# Patient Record
Sex: FEMALE | Race: WHITE | NOT HISPANIC OR LATINO | Employment: OTHER | URBAN - METROPOLITAN AREA
[De-identification: names, ages, dates, MRNs, and addresses within clinical notes are randomized per-mention and may not be internally consistent; named-entity substitution may affect disease eponyms.]

---

## 2019-12-06 ENCOUNTER — TELEPHONE (OUTPATIENT)
Dept: SURGERY | Facility: HOSPITAL | Age: 29
End: 2019-12-06

## 2019-12-13 ENCOUNTER — ANESTHESIA EVENT (OUTPATIENT)
Dept: SURGERY | Facility: HOSPITAL | Age: 29
End: 2019-12-13
Payer: MEDICAID

## 2019-12-13 ENCOUNTER — HOSPITAL ENCOUNTER (OUTPATIENT)
Dept: PREADMISSION TESTING | Facility: HOSPITAL | Age: 29
Discharge: HOME | End: 2019-12-13
Attending: ORTHOPAEDIC SURGERY
Payer: MEDICAID

## 2019-12-13 DIAGNOSIS — Z01.818 PREOP TESTING: Primary | ICD-10-CM

## 2019-12-13 RX ORDER — CEFAZOLIN SODIUM 2 G/50ML
2 SOLUTION INTRAVENOUS
Status: CANCELLED | OUTPATIENT
Start: 2019-12-17

## 2019-12-13 RX ORDER — SODIUM CHLORIDE, SODIUM LACTATE, POTASSIUM CHLORIDE, CALCIUM CHLORIDE 600; 310; 30; 20 MG/100ML; MG/100ML; MG/100ML; MG/100ML
INJECTION, SOLUTION INTRAVENOUS CONTINUOUS
Status: CANCELLED | OUTPATIENT
Start: 2019-12-13

## 2019-12-13 RX ORDER — LIDOCAINE HYDROCHLORIDE 10 MG/ML
1 INJECTION, SOLUTION EPIDURAL; INFILTRATION; INTRACAUDAL; PERINEURAL ONCE
Status: CANCELLED | OUTPATIENT
Start: 2019-12-13 | End: 2019-12-13

## 2019-12-13 NOTE — DISCHARGE INSTRUCTIONS
Your surgery is scheduled for 12/17/19.    Please report to Procedure Check In Room on the 2nd FLOOR at 7:15a.m.          INSTRUCTIONS IMPORTANT!!!  ¨ Do not eat or drink after 12 midnight-including water. OK to brush teeth, no   gum, candy or mints!        ____  Proceed to [facility] on 12/13/19 for additional testing.        ____  Do not wear makeup, including mascara.  ____  No powder, lotions or creams to surgical area.  ____  Please remove all jewelry, including piercings and leave at home.  ____  No money or valuables needed. Please leave at home.  ____  Please bring any documents given by your doctor.  ____  If going home the same day, arrange for a ride home. You will not be able to             drive if Anesthesia was used.  ____  Wear loose fitting clothing. Allow for dressings, bandages.  ____  Stop Aspirin, Ibuprofen, Motrin and Aleve at least 3-5 days before surgery, unless otherwise instructed by your doctor, or the nurse.   You MAY use Tylenol/acetaminophen until day of surgery.  ____  Wash the surgical area with Hibiclens the night before surgery, and again the             morning of surgery.  Be sure to rinse hibiclens off completely (if instructed by   nurse).  ____  If you take diabetic medication, do not take am of surgery unless instructed by Doctor.  ____  Call MD for temperature above 101 degrees or any other signs of infection such as Urinary (bladder) infection, Upper respiratory infection, skin boils, etc.  ____ Stop taking any Fish Oil supplement or any Vitamins that contain Vitamin E at least 5 days prior to surgery.  ____ Do Not wear your contact lenses the day of your procedure.  You may wear your glasses.      ____Do not shave surgical site for 3 days prior to surgery.  ____ Practice Good hand washing before, during, and after procedure.      I have read or had read and explained to me, and understand the above information.  Additional comments or instructions:  For  additional questions call [phone]      ANESTHESIA SIDE EFFECTS  -For the first 24 hours after surgery:  Do not drive, use heavy equipment, make important decisions, or drink alcohol  -It is normal to feel sleepy for several hours.  Rest until you are more awake.  -Have someone stay with you, if needed.  They can watch for problems and help keep you safe.  -Some possible post anesthesia side effects include: nausea and vomiting, sore throat and hoarseness, sleepiness, and dizziness.        Pre-Op Bathing Instructions    Before surgery, you can play an important role in your own health.    Because skin is not sterile, we need to be sure that your skin is as free of germs as possible. By following the instructions below, you can reduce the number of germs on your skin before surgery.    IMPORTANT: You will need to shower with a special soap called Hibiclens*, available at any pharmacy.  If you are allergic to Chlorhexidine (the antiseptic in Hibiclens), use an antibacterial soap such as Dial Soap for your preoperative shower.  You will shower with Hibiclens both the night before your surgery and the morning of your surgery.  Do not use Hibiclens on the head, face or genitals to avoid injury to those areas.    STEP #1: THE NIGHT BEFORE YOUR SURGERY     1. Do not shave the area of your body where your surgery will be performed.  2. Shower and wash your hair and body as usual with your normal soap and shampoo.  3. Rinse your hair and body thoroughly after you shower to remove all soap residue.  4. With your hand, apply one packet of Hibiclens soap to the surgical site.   5. Wash the site gently for five (5) minutes. Do not scrub your skin too hard.   6. Do not wash with your regular soap after Hibiclens is used.  7. Rinse your body thoroughly.  8. Pat yourself dry with a clean, soft towel.  9. Do not use lotion, cream, or powder.  10. Wear clean clothes.    STEP #2: THE MORNING OF YOUR SURGERY     1. Repeat Step #1.    *  Not to be used by people allergic to Chlorhexidine.          Surgery for Anterior Cruciate Ligament (ACL) Injury  The ACL (anterior cruciate ligament) is a band of tough, fibrous tissue that helps stabilize the knee. Injury to this ligament often happens when the knee is forced beyond its normal range of motion. This can stretch or tear the ligament, much like the fibers of a rope coming apart. Both surgical and nonsurgical treatment has been used to recover from an ACL tear. Several types of surgery are available based on your and your healthcare provider's preferences, as well as other factors. Some surgeons will operate soon after an ACL tear. Others prefer several weeks of physical therapy first. There are also different anesthesia choices available.  Preparing for surgery  Do's and don'ts:  · Stop taking aspirin and other medicines 7 or more days before surgery as advised by your healthcare provider.  · Arrange to get crutches to use during recovery.  · Follow any directions you are given for not eating or drinking before surgery.  · Arrange for an adult to drive you home after surgery.   During surgery      The most common type of surgery for an ACL injury is reconstruction. Several types of surgeries are used:  · Patellar tendon graft. This uses a piece of your own patellar tendon between the knee cap and tibia.  · Quadriceps tendon graft. This uses a piece of your own quadriceps tendon between the quadriceps muscle and the knee cap.  · Hamstring tendon graft. This uses a piece of your own hamstring tendon between the hamstring muscle and the tibia.  · A cadaver (allograft) tendon graft. This uses any one of several tendons from a cadaver.  To rebuild your ACL, your healthcare provider may do open surgery or arthroscopy. During arthroscopy, a long, thin tube with a tiny camera is inserted into the knee joint so your healthcare provider can see inside the joint. Tools inserted through small incisions are used  to repair the joint.  After surgery  Here is what to expect:  · Youll spend a few hours in a recovery area. Youll have ice on your knee to prevent swelling, and your leg may be in a brace.  · You may get a continuous cooling machine to relieve swelling and pain.  · You may get medicines to reduce pain and swelling. Take these as prescribed by your healthcare provider.  · Depending on the procedure, physical therapy may begin shortly after surgery. This may include light exercises. In some cases, you may use a CPM (continuous passive motion) machine for a time. This machine flexes and extends the knee, keeping it from getting stiff.  · You can usually go home the same day as surgery. Have an adult family member or friend give you a ride.  Date Last Reviewed: 9/30/2015  © 2890-3775 [employer]. [address] [place] [address], [place] [address]. All rights reserved. This information is not intended as a substitute for professional medical care. Always follow your healthcare professional's instructions.

## 2019-12-13 NOTE — ANESTHESIA PREPROCEDURE EVALUATION
12/13/2019  Jacqueline Lockhart is a 29 y.o., female scheduled for right ACL reconstruction and right meniscus repair on 12/17/19.    Past Medical History:   Diagnosis Date    Closed head injury      Past Surgical History:   Procedure Laterality Date    TONSILLECTOMY         Anesthesia Evaluation    I have reviewed the Patient Summary Reports.    I have reviewed the Nursing Notes.   I have reviewed the Medications.     Review of Systems  Anesthesia Hx:  No problems with previous Anesthesia  Denies Family Hx of Anesthesia complications.    Social:  Social Alcohol Use  Illicit Drug Use: Types of drugs include Marijuana,   Hematology/Oncology:  Hematology Normal        Cardiovascular:  Cardiovascular Normal Exercise tolerance: good   Denies Angina.    Pulmonary:  Pulmonary Normal    Renal/:  Renal/ Normal     Hepatic/GI:  Hepatic/GI Normal    Neurological:  Neurology Normal    Endocrine:  Endocrine Normal        Physical Exam  General:  Well nourished    Airway/Jaw/Neck:  Airway Findings: Mouth Opening: Normal Tongue: Normal  General Airway Assessment: Adult  Mallampati: II  Improves to I with phonation.  TM Distance: Normal, at least 6 cm       Chest/Lungs:  Chest/Lungs Findings: Clear to auscultation, Normal Respiratory Rate     Heart/Vascular:  Heart Findings: Rate: Normal  Rhythm: Regular Rhythm  Sounds: Normal        Mental Status:  Mental Status Findings:  Cooperative, Alert and Oriented         Anesthesia Plan  Type of Anesthesia, risks & benefits discussed:  Anesthesia Type:  general, regional  Patient's Preference:   Intra-op Monitoring Plan: standard ASA monitors  Intra-op Monitoring Plan Comments:   Post Op Pain Control Plan: multimodal analgesia  Post Op Pain Control Plan Comments:   Induction:   IV  Beta Blocker:  Patient is not currently on a Beta-Blocker (No further documentation required).        Informed Consent: Patient understands risks and agrees with Anesthesia plan.  Questions answered. Anesthesia consent signed with patient.  ASA Score: 2     Day of Surgery Review of History & Physical:        Anesthesia Plan Notes: Anesthesia consent to be signed prior to procedure on 12/17/19          Ready For Surgery From Anesthesia Perspective.

## 2019-12-16 NOTE — H&P
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 The patient jumped off of a 5 foot tall stage on October 19, 2019 and sustained a right knee injury. Thereafter the patient had significant swelling of the knee with persistent sensation of instability and block to full range of motion. Since the injury her pain has improved but she continues with these instability episodes as well as inability to fully flex her knee. She denies any other injuries or neurologic complaints.    Location: Right knee    Severity: Pain is 5-8 out of 10    Timing: Pain is worse with activity and instability is worse with twisting motion    Modifying factors: Improved with rest    Quality: Pain is sharp    Duration: Onset 10/19/2019    Context: Associated with jump from 5 feet    Associated signs and symptoms:'s associated with swelling, instability episodes, block to motion       Review of Systems  See chart for complete Review of Systems, Family, and Social history noted by patient and personally reviewed today.      Results/Data    MRI of right knee taken 11/13/2019 at an outside facility was personally reviewed. The patient has an ACL rupture with bone bruising of the lateral tibial plateau and lateral distal femoral condyle. She has large meniscal tears of the medial meniscus and the lateral meniscus. She has a significant effusion. PCL is intact. Posterior lateral corner is intact. Medial collateral ligament is intact. No acute fractures or dislocations are noted in these images. Patellar and quadriceps tendons are intact.     Vitals   Recorded: 07Nov2019 02:09PM   Height 5 ft 2 in   Weight 175 lb    BMI Calculated 32.01   BSA Calculated 1.81   Systolic 107   Diastolic 70   Heart Rate 67   Pain Scale 8     Physical Exam  Vital signs are noted in the chart above.     The patient appears generally well and stated age.    The patient is oriented x 3.    Mood  and affect are appropriate and normal.     Gait is mildly antalgic favoring the right lower extremity.    Right knee is examined and found to have a mild effusion. No erythema or warmth noted about the knee. No open wounds. Range of motion is about 10degrees to 45 degrees with a hard stop/block to flexion at that degree. Patient has a grade 2 Lockman with no endpoint and a grade 2 pivot shift. She has pain with lateral Jazmin examination which is unable to be fully assessed due to her block of motion. The knee is stable to varus and valgus directed stress. Posterior drawer is negative. She has grossly normal motor and sensory function with a warm and well-perfused extremity distally.     Assessment   1. Rupture of anterior cruciate ligament of right knee, subsequent encounter (S83.057D)   · The patient has a confirmed ACL rupture on her MRI of her right knee. After discussing      the patient's desired level of activity and her inability to do so given her current knee      condition we discussed surgical treatment. Surgery would involve ACL ligament      reconstruction. We discussed different graft types and decided that the patient would      benefit from an Achilles allograft reconstruction. Postoperative course following this      particular ACL reconstruction will depend on the status of the meniscus, whether      repair is indicated or not defined intraoperatively. The patient would like to undergo      surgical treatment early December. I have given a list of home exercises to work      on knee range of motion until surgery.   2. Acute medial meniscus tear of right knee, initial encounter (S83.225A)   · Patient has a medial meniscus tear on MRI which appears to be a large tear involving      the majority of the body. This will likely require repair intraoperatively. The size and      morphology of the tear will determine the postoperative weightbearing restrictions. I have      advised the patient as to the  weightbearing restrictions required with meniscus repair      and she agrees to abide by them after surgery.   3. Peripheral tear of lateral meniscus of right knee as current injury, initial encounter   (S83.386A)   · The patient has a large lateral meniscus tear which would benefit from repair      intraoperatively. I have instructed the patient as to the weightbearing restrictions following      meniscus repair and she agrees to abide.    Plan  1. Plan for right knee ACL arthroscopic reconstruction with Achilles allograft, medial meniscus repair, lateral meniscus repair  #2 patient given knee exercises to improve range of motion  3. Patient is to avoid exercising and aggressive physical activity until surgery.  #4 patient will follow-up after surgery.

## 2019-12-17 ENCOUNTER — HOSPITAL ENCOUNTER (OUTPATIENT)
Facility: HOSPITAL | Age: 29
Discharge: HOME | End: 2019-12-17
Attending: ORTHOPAEDIC SURGERY | Admitting: ORTHOPAEDIC SURGERY
Payer: MEDICAID

## 2019-12-17 ENCOUNTER — ANESTHESIA (OUTPATIENT)
Dept: SURGERY | Facility: HOSPITAL | Age: 29
End: 2019-12-17
Payer: MEDICAID

## 2019-12-17 VITALS
DIASTOLIC BLOOD PRESSURE: 62 MMHG | HEART RATE: 76 BPM | RESPIRATION RATE: 20 BRPM | OXYGEN SATURATION: 98 % | TEMPERATURE: 98 F | HEIGHT: 62 IN | BODY MASS INDEX: 32.2 KG/M2 | WEIGHT: 175 LBS | SYSTOLIC BLOOD PRESSURE: 131 MMHG

## 2019-12-17 DIAGNOSIS — Z01.818 PREOP TESTING: ICD-10-CM

## 2019-12-17 DIAGNOSIS — S83.519A ACL (ANTERIOR CRUCIATE LIGAMENT) RUPTURE: ICD-10-CM

## 2019-12-17 PROCEDURE — 36000711: Performed by: ORTHOPAEDIC SURGERY

## 2019-12-17 PROCEDURE — 99900037 HC PT THERAPY SCREENING (STAT)

## 2019-12-17 PROCEDURE — C1713 ANCHOR/SCREW BN/BN,TIS/BN: HCPCS | Performed by: ORTHOPAEDIC SURGERY

## 2019-12-17 PROCEDURE — 63600175 PHARM REV CODE 636 W HCPCS: Performed by: NURSE ANESTHETIST, CERTIFIED REGISTERED

## 2019-12-17 PROCEDURE — 01400 ANES OPN/ARTHRS KNEE JT NOS: CPT | Performed by: ORTHOPAEDIC SURGERY

## 2019-12-17 PROCEDURE — 63600175 PHARM REV CODE 636 W HCPCS: Performed by: ORTHOPAEDIC SURGERY

## 2019-12-17 PROCEDURE — 71000015 HC POSTOP RECOV 1ST HR: Performed by: ORTHOPAEDIC SURGERY

## 2019-12-17 PROCEDURE — 64447 NJX AA&/STRD FEMORAL NRV IMG: CPT | Mod: 59 | Performed by: STUDENT IN AN ORGANIZED HEALTH CARE EDUCATION/TRAINING PROGRAM

## 2019-12-17 PROCEDURE — 25000003 PHARM REV CODE 250: Performed by: ORTHOPAEDIC SURGERY

## 2019-12-17 PROCEDURE — 27201423 OPTIME MED/SURG SUP & DEVICES STERILE SUPPLY: Performed by: ORTHOPAEDIC SURGERY

## 2019-12-17 PROCEDURE — 71000016 HC POSTOP RECOV ADDL HR: Performed by: ORTHOPAEDIC SURGERY

## 2019-12-17 PROCEDURE — 37000009 HC ANESTHESIA EA ADD 15 MINS: Performed by: ORTHOPAEDIC SURGERY

## 2019-12-17 PROCEDURE — C9290 INJ, BUPIVACAINE LIPOSOME: HCPCS | Performed by: ORTHOPAEDIC SURGERY

## 2019-12-17 PROCEDURE — 71000039 HC RECOVERY, EACH ADD'L HOUR: Performed by: ORTHOPAEDIC SURGERY

## 2019-12-17 PROCEDURE — 63600175 PHARM REV CODE 636 W HCPCS: Performed by: ANESTHESIOLOGY

## 2019-12-17 PROCEDURE — 36000710: Performed by: ORTHOPAEDIC SURGERY

## 2019-12-17 PROCEDURE — 37000008 HC ANESTHESIA 1ST 15 MINUTES: Performed by: ORTHOPAEDIC SURGERY

## 2019-12-17 PROCEDURE — 71000033 HC RECOVERY, INTIAL HOUR: Performed by: ORTHOPAEDIC SURGERY

## 2019-12-17 PROCEDURE — 25000003 PHARM REV CODE 250: Performed by: ANESTHESIOLOGY

## 2019-12-17 PROCEDURE — C1762 CONN TISS, HUMAN(INC FASCIA): HCPCS | Performed by: ORTHOPAEDIC SURGERY

## 2019-12-17 DEVICE — KIT SECONDARY FIX ACL PCL REP: Type: IMPLANTABLE DEVICE | Site: KNEE | Status: FUNCTIONAL

## 2019-12-17 DEVICE — IMPLANTABLE DEVICE: Type: IMPLANTABLE DEVICE | Site: KNEE | Status: FUNCTIONAL

## 2019-12-17 DEVICE — TENDON ACHILLES W/CALC 19.5CM: Type: IMPLANTABLE DEVICE | Site: KNEE | Status: FUNCTIONAL

## 2019-12-17 DEVICE — SCREW CANNULATED 7MM X 20MM: Type: IMPLANTABLE DEVICE | Site: KNEE | Status: FUNCTIONAL

## 2019-12-17 RX ORDER — SODIUM CHLORIDE, SODIUM LACTATE, POTASSIUM CHLORIDE, CALCIUM CHLORIDE 600; 310; 30; 20 MG/100ML; MG/100ML; MG/100ML; MG/100ML
INJECTION, SOLUTION INTRAVENOUS CONTINUOUS
Status: DISCONTINUED | OUTPATIENT
Start: 2019-12-17 | End: 2019-12-17 | Stop reason: HOSPADM

## 2019-12-17 RX ORDER — CELECOXIB 200 MG/1
200 CAPSULE ORAL 2 TIMES DAILY
Qty: 28 CAPSULE | Refills: 0 | Status: SHIPPED | OUTPATIENT
Start: 2019-12-17 | End: 2019-12-31

## 2019-12-17 RX ORDER — CEFAZOLIN SODIUM 2 G/50ML
2 SOLUTION INTRAVENOUS ONCE
Status: DISCONTINUED | OUTPATIENT
Start: 2019-12-17 | End: 2019-12-17 | Stop reason: HOSPADM

## 2019-12-17 RX ORDER — BUPIVACAINE HCL/EPINEPHRINE 0.25-.0005
VIAL (ML) INJECTION
Status: DISCONTINUED | OUTPATIENT
Start: 2019-12-17 | End: 2019-12-17 | Stop reason: HOSPADM

## 2019-12-17 RX ORDER — SODIUM CHLORIDE, SODIUM LACTATE, POTASSIUM CHLORIDE, CALCIUM CHLORIDE 600; 310; 30; 20 MG/100ML; MG/100ML; MG/100ML; MG/100ML
INJECTION, SOLUTION INTRAVENOUS CONTINUOUS PRN
Status: DISCONTINUED | OUTPATIENT
Start: 2019-12-17 | End: 2019-12-17

## 2019-12-17 RX ORDER — LIDOCAINE HYDROCHLORIDE 10 MG/ML
1 INJECTION, SOLUTION EPIDURAL; INFILTRATION; INTRACAUDAL; PERINEURAL ONCE
Status: DISCONTINUED | OUTPATIENT
Start: 2019-12-17 | End: 2019-12-17 | Stop reason: HOSPADM

## 2019-12-17 RX ORDER — PROPOFOL 10 MG/ML
VIAL (ML) INTRAVENOUS
Status: DISCONTINUED | OUTPATIENT
Start: 2019-12-17 | End: 2019-12-17

## 2019-12-17 RX ORDER — ONDANSETRON 2 MG/ML
4 INJECTION INTRAMUSCULAR; INTRAVENOUS DAILY PRN
Status: DISCONTINUED | OUTPATIENT
Start: 2019-12-17 | End: 2019-12-17 | Stop reason: HOSPADM

## 2019-12-17 RX ORDER — FENTANYL CITRATE 50 UG/ML
INJECTION, SOLUTION INTRAMUSCULAR; INTRAVENOUS
Status: DISCONTINUED | OUTPATIENT
Start: 2019-12-17 | End: 2019-12-17

## 2019-12-17 RX ORDER — CELECOXIB 100 MG/1
200 CAPSULE ORAL
Status: COMPLETED | OUTPATIENT
Start: 2019-12-17 | End: 2019-12-17

## 2019-12-17 RX ORDER — ACETAMINOPHEN 500 MG
1000 TABLET ORAL
Status: COMPLETED | OUTPATIENT
Start: 2019-12-17 | End: 2019-12-17

## 2019-12-17 RX ORDER — ONDANSETRON 2 MG/ML
INJECTION INTRAMUSCULAR; INTRAVENOUS
Status: DISCONTINUED | OUTPATIENT
Start: 2019-12-17 | End: 2019-12-17

## 2019-12-17 RX ORDER — KETOROLAC TROMETHAMINE 30 MG/ML
INJECTION, SOLUTION INTRAMUSCULAR; INTRAVENOUS
Status: DISCONTINUED | OUTPATIENT
Start: 2019-12-17 | End: 2019-12-17

## 2019-12-17 RX ORDER — OXYCODONE HYDROCHLORIDE 5 MG/1
5 TABLET ORAL
Status: DISCONTINUED | OUTPATIENT
Start: 2019-12-17 | End: 2019-12-17 | Stop reason: HOSPADM

## 2019-12-17 RX ORDER — CEFAZOLIN SODIUM 2 G/50ML
2 SOLUTION INTRAVENOUS
Status: DISCONTINUED | OUTPATIENT
Start: 2019-12-17 | End: 2019-12-17 | Stop reason: HOSPADM

## 2019-12-17 RX ORDER — CEFAZOLIN SODIUM 2 G/50ML
2 SOLUTION INTRAVENOUS
Status: COMPLETED | OUTPATIENT
Start: 2019-12-17 | End: 2019-12-17

## 2019-12-17 RX ORDER — ASPIRIN 325 MG
325 TABLET, DELAYED RELEASE (ENTERIC COATED) ORAL DAILY
Qty: 30 TABLET | Refills: 0 | Status: SHIPPED | OUTPATIENT
Start: 2019-12-17 | End: 2020-01-16

## 2019-12-17 RX ORDER — HYDROMORPHONE HYDROCHLORIDE 2 MG/ML
0.5 INJECTION, SOLUTION INTRAMUSCULAR; INTRAVENOUS; SUBCUTANEOUS EVERY 5 MIN PRN
Status: COMPLETED | OUTPATIENT
Start: 2019-12-17 | End: 2019-12-17

## 2019-12-17 RX ORDER — SODIUM CHLORIDE 9 MG/ML
INJECTION, SOLUTION INTRAVENOUS CONTINUOUS
Status: ACTIVE | OUTPATIENT
Start: 2019-12-17

## 2019-12-17 RX ORDER — MIDAZOLAM HYDROCHLORIDE 1 MG/ML
INJECTION, SOLUTION INTRAMUSCULAR; INTRAVENOUS
Status: DISCONTINUED | OUTPATIENT
Start: 2019-12-17 | End: 2019-12-17

## 2019-12-17 RX ORDER — DEXAMETHASONE SODIUM PHOSPHATE 4 MG/ML
INJECTION, SOLUTION INTRA-ARTICULAR; INTRALESIONAL; INTRAMUSCULAR; INTRAVENOUS; SOFT TISSUE
Status: DISCONTINUED | OUTPATIENT
Start: 2019-12-17 | End: 2019-12-17

## 2019-12-17 RX ORDER — PREGABALIN 75 MG/1
300 CAPSULE ORAL
Status: COMPLETED | OUTPATIENT
Start: 2019-12-17 | End: 2019-12-17

## 2019-12-17 RX ORDER — SUCCINYLCHOLINE CHLORIDE 20 MG/ML
INJECTION INTRAMUSCULAR; INTRAVENOUS
Status: DISCONTINUED | OUTPATIENT
Start: 2019-12-17 | End: 2019-12-17

## 2019-12-17 RX ORDER — EPINEPHRINE 1 MG/ML
INJECTION, SOLUTION INTRACARDIAC; INTRAMUSCULAR; INTRAVENOUS; SUBCUTANEOUS
Status: DISCONTINUED | OUTPATIENT
Start: 2019-12-17 | End: 2019-12-17 | Stop reason: HOSPADM

## 2019-12-17 RX ORDER — GABAPENTIN 300 MG/1
300 CAPSULE ORAL NIGHTLY
Qty: 7 CAPSULE | Refills: 0 | Status: SHIPPED | OUTPATIENT
Start: 2019-12-17 | End: 2019-12-24

## 2019-12-17 RX ORDER — LIDOCAINE HCL/PF 100 MG/5ML
SYRINGE (ML) INTRAVENOUS
Status: DISCONTINUED | OUTPATIENT
Start: 2019-12-17 | End: 2019-12-17

## 2019-12-17 RX ORDER — BUPIVACAINE HYDROCHLORIDE 2.5 MG/ML
INJECTION, SOLUTION EPIDURAL; INFILTRATION; INTRACAUDAL
Status: DISCONTINUED | OUTPATIENT
Start: 2019-12-17 | End: 2019-12-17 | Stop reason: HOSPADM

## 2019-12-17 RX ORDER — ACETAMINOPHEN 500 MG
1000 TABLET ORAL EVERY 8 HOURS
Qty: 42 TABLET | Refills: 0 | Status: SHIPPED | OUTPATIENT
Start: 2019-12-17 | End: 2019-12-24

## 2019-12-17 RX ORDER — OXYCODONE HYDROCHLORIDE 5 MG/1
5 TABLET ORAL EVERY 4 HOURS PRN
Qty: 30 TABLET | Refills: 0 | Status: SHIPPED | OUTPATIENT
Start: 2019-12-17

## 2019-12-17 RX ORDER — MUPIROCIN 20 MG/G
OINTMENT TOPICAL
Status: DISPENSED | OUTPATIENT
Start: 2019-12-17

## 2019-12-17 RX ADMIN — FENTANYL CITRATE 50 MCG: 50 INJECTION, SOLUTION INTRAMUSCULAR; INTRAVENOUS at 11:12

## 2019-12-17 RX ADMIN — SODIUM CHLORIDE, SODIUM LACTATE, POTASSIUM CHLORIDE, AND CALCIUM CHLORIDE: .6; .31; .03; .02 INJECTION, SOLUTION INTRAVENOUS at 10:12

## 2019-12-17 RX ADMIN — ACETAMINOPHEN 1000 MG: 500 TABLET ORAL at 08:12

## 2019-12-17 RX ADMIN — SUCCINYLCHOLINE CHLORIDE 120 MG: 20 INJECTION, SOLUTION INTRAMUSCULAR; INTRAVENOUS at 09:12

## 2019-12-17 RX ADMIN — PROPOFOL 200 MG: 10 INJECTION, EMULSION INTRAVENOUS at 09:12

## 2019-12-17 RX ADMIN — MUPIROCIN: 20 OINTMENT TOPICAL at 08:12

## 2019-12-17 RX ADMIN — OXYCODONE HYDROCHLORIDE 5 MG: 5 TABLET ORAL at 12:12

## 2019-12-17 RX ADMIN — FENTANYL CITRATE 100 MCG: 50 INJECTION, SOLUTION INTRAMUSCULAR; INTRAVENOUS at 10:12

## 2019-12-17 RX ADMIN — HYDROMORPHONE HYDROCHLORIDE 0.5 MG: 2 INJECTION INTRAMUSCULAR; INTRAVENOUS; SUBCUTANEOUS at 12:12

## 2019-12-17 RX ADMIN — MIDAZOLAM 5 MG: 1 INJECTION INTRAMUSCULAR; INTRAVENOUS at 08:12

## 2019-12-17 RX ADMIN — DEXAMETHASONE SODIUM PHOSPHATE 10 MG: 4 INJECTION, SOLUTION INTRAMUSCULAR; INTRAVENOUS at 09:12

## 2019-12-17 RX ADMIN — PREGABALIN 300 MG: 75 CAPSULE ORAL at 08:12

## 2019-12-17 RX ADMIN — KETOROLAC TROMETHAMINE 30 MG: 30 INJECTION, SOLUTION INTRAMUSCULAR; INTRAVENOUS at 11:12

## 2019-12-17 RX ADMIN — SODIUM CHLORIDE, SODIUM LACTATE, POTASSIUM CHLORIDE, AND CALCIUM CHLORIDE: .6; .31; .03; .02 INJECTION, SOLUTION INTRAVENOUS at 09:12

## 2019-12-17 RX ADMIN — ONDANSETRON 8 MG: 2 INJECTION, SOLUTION INTRAMUSCULAR; INTRAVENOUS at 11:12

## 2019-12-17 RX ADMIN — FENTANYL CITRATE 100 MCG: 50 INJECTION, SOLUTION INTRAMUSCULAR; INTRAVENOUS at 09:12

## 2019-12-17 RX ADMIN — LIDOCAINE HYDROCHLORIDE 100 MG: 20 INJECTION, SOLUTION INTRAVENOUS at 09:12

## 2019-12-17 RX ADMIN — CELECOXIB 200 MG: 100 CAPSULE ORAL at 08:12

## 2019-12-17 RX ADMIN — SODIUM CHLORIDE, SODIUM LACTATE, POTASSIUM CHLORIDE, AND CALCIUM CHLORIDE: .6; .31; .03; .02 INJECTION, SOLUTION INTRAVENOUS at 08:12

## 2019-12-17 RX ADMIN — CEFAZOLIN SODIUM 2 G: 2 SOLUTION INTRAVENOUS at 09:12

## 2019-12-17 NOTE — ANESTHESIA PROCEDURE NOTES
Peripheral Block    Patient location during procedure: pre-op   Block not for primary anesthetic.  Reason for block: at surgeon's request and post-op pain management   Post-op Pain Location: Right Leg   Start time: 12/17/2019 8:42 AM  Timeout: 12/17/2019 8:41 AM   End time: 12/17/2019 8:50 AM    Staffing  Authorizing Provider: Ladarius Contreras MD  Performing Provider: Vicente Hampton MD    Preanesthetic Checklist  Completed: patient identified, site marked, surgical consent, pre-op evaluation, timeout performed, IV checked, risks and benefits discussed and monitors and equipment checked  Peripheral Block  Patient position: supine  Prep: ChloraPrep  Patient monitoring: heart rate, cardiac monitor, continuous pulse ox, continuous capnometry and frequent blood pressure checks  Block type: adductor canal  Laterality: right  Injection technique: single shot  Needle  Needle type: Stimuplex   Needle gauge: 21 G  Needle length: 4 in  Needle localization: anatomical landmarks and ultrasound guidance   -ultrasound image captured on disc.  Assessment  Injection assessment: negative aspiration, negative parasthesia and local visualized surrounding nerve  Paresthesia pain: none  Heart rate change: no  Slow fractionated injection: yes  Additional Notes  VSS.  [facility] RN monitoring vitals throughout procedure.  Patient tolerated procedure well.     30 ml 0.2% Ropi, 4 mg Dexamethasone, 25 mcg Precedex

## 2019-12-17 NOTE — PT/OT/SLP PROGRESS
Physical Therapy Screen      Patient Name:  Jacqueline Lockhart   MRN:  [id number]    Patient seen bedside prior to surgery, has own axillary crutches. Adjusted and instructed with axillary crutches NWB gait on level and unlevel surface. Patient Mod I with gait and transitional mvts. Will DC PT service.    Krish Flannery, PT

## 2019-12-17 NOTE — BRIEF OP NOTE
[facility]  Brief Operative Note    Surgery Date: 12/17/2019     Surgeon(s) and Role:     * Ladarius George IV, MD - Primary    Assisting Surgeon: None    Pre-op Diagnosis:  Rupture of anterior cruciate ligament of right knee [S83.511A]  Acute medial meniscus tear of right knee [S83.241A]  Peripheral tear of lateral meniscus of right knee as current injury [S83.261A]    Post-op Diagnosis:  Post-Op Diagnosis Codes:     * Rupture of anterior cruciate ligament of right knee [S83.511A]     * Acute medial meniscus tear of right knee [S83.241A]     * Peripheral tear of lateral meniscus of right knee as current injury [S83.261A]    Procedure(s) (LRB):  RECONSTRUCTION, KNEE, ACL, USING GRAFT (Right)  MENISCECTOMY, KNEE, MEDIAL and a lateral (Right)    Anesthesia: General    Description of the findings of the procedure(s): see operative report    Estimated Blood Loss: * No values recorded between 12/17/2019  9:49 AM and 12/17/2019 11:52 AM *         Specimens:   Specimen (12h ago, onward)    None            Discharge Note    OUTCOME: Patient tolerated treatment/procedure well without complication and is now ready for discharge.    DISPOSITION: Home or Self Care    FOLLOWUP: In clinic

## 2019-12-17 NOTE — ANESTHESIA POSTPROCEDURE EVALUATION
Anesthesia Post Evaluation    Patient: Jacqueline Lockhart    Procedure(s) Performed: Procedure(s) (LRB):  RECONSTRUCTION, KNEE, ACL, USING GRAFT (Right)  MENISCECTOMY, KNEE, MEDIAL and a lateral (Right)    Final Anesthesia Type: general    Patient location during evaluation: PACU  Patient participation: Yes- Able to Participate  Level of consciousness: awake and alert  Post-procedure vital signs: reviewed and stable  Pain management: adequate  Airway patency: patent  BERNADETTE mitigation strategies: Multimodal analgesia and Use of major conduction anesthesia (spinal/epidural) or peripheral nerve block  PONV status at discharge: No PONV  Anesthetic complications: no      Cardiovascular status: hemodynamically stable and blood pressure returned to baseline  Respiratory status: room air, spontaneous ventilation and unassisted  Hydration status: euvolemic  Follow-up not needed.          Vitals Value Taken Time   /80 12/17/2019 12:58 PM   Temp 36.6 °C (97.8 °F) 12/17/2019 12:55 PM   Pulse 70 12/17/2019 12:58 PM   Resp 13 12/17/2019 12:58 PM   SpO2 94 % 12/17/2019 12:58 PM   Vitals shown include unvalidated device data.      Event Time     Out of Recovery 13:00:21          Pain/Benjie Score: Pain Rating Prior to Med Admin: 6 (12/17/2019 12:52 PM)  Pain Rating Post Med Admin: 2 (12/17/2019  1:00 PM)  Benjie Score: 10 (12/17/2019  1:00 PM)

## 2019-12-17 NOTE — PLAN OF CARE
Assisted up to BR to void without difficulty. Ambulated well with crutches. Gait belt used,  verbalizes and return demo of understanding of use.  Meets criteria for discharge. VSS, AAOx3. Tolerating po fluids without nausea. IV discontinued with tip intact.Discharge instructions given. Time allowed for questions. Verbalizes understanding. Discharged to home in good condition.

## 2019-12-17 NOTE — OP NOTE
Operative Report    IGNACIO JARRETT  : 1990  MRN: [id number]    Date of Procedure: 2019     Pre-op Diagnosis:     Right ACL Tear.   Right medial meniscus tear   Right lateral meniscus tear    Post-op Diagnosis:     Same    Procedure:   Diagnostic arthroscopy.   (47582) ACL reconstruction with Achilles allograft.   (80567) Medial and lateral partial meniscectomy      Surgeon: Ladarius George IV, MD     Assisting Surgeon:     Won Carvalho MD and Corinne Cloud, MD    Anesthesiologist: Ladarius Contreras MD    Anesthesia:    general and regional     Estimated Blood Loss: minimal         Specimens: none    Findings:  Effusion: Present   Patella: Normal   Trochlea: Normal   Medial cartilage: Normal   Medial meniscus: Complex tear   Lateral cartilage: Normal   Lateral meniscus: Complex tear   ACL: Torn   PCL: Normal   Other: None    Field of view:  9    Tourniquet:  275mmHg.  118 minutes.    Indications for surgery:   Ignacio Jarrett is a 29 y.o. female who sustained a traumatic injury to the knee.  The history, examination, and imaging were consistent with instability related to an ACL tear.  The patient was also noted to have a medial and lateral meniscus tear on preoperative imaging.  The risks and benefits of surgery including the use of an allograft were discussed, and the patient would like to proceed.    Description of procedure:   The patient was given preoperative antibiotics for prophylaxis against infection.  The patient was taken to the operating room and was placed supine on the OR table.  After adequate general anesthesia an exam was performed, which showed a positive Lachman and a positive pivot shift.  A well-padded proximal thigh tourniquet was placed. The lower extremity was prepped and draped in the standard sterile fashion.  Local anesthetic was injected at the proposed incisions.  An Esmarch was used to exsanguinate the limb, and the tourniquet was inflated to 275 mmHg.       A  standard anterolateral portal was then made, and the arthroscope was introduced.  Under direct vision with the use of a spinal needle an anteromedial portal was made, and a probe was placed in the knee.  A thorough diagnostic arthroscopy was performed, and the findings noted above were seen.      (91490) Medial meniscus was found to have a complex tear extending from the posterior horn to the body which was deemed irreparable due to the amount of fraying at the edges of the tissue.  The lateral meniscus was similarly found to have a complex tear extending from the posterior horn to the body, also irreparable.  Therefore, a combination of motorized shaver and biters were used to trim the damaged medial and lateral meniscal tissue back to a stable rim.      (92983) Attention was turned to the stump of the ACL.  A motorized shaver was used to debride the stump. A naveen was used to perform a limited notchplasty to improve visualization of the posterior wall.  A tunnel was then drilled to the anatomic footprint in the tibia.  A femoral socket was then drilled using the medial portal with the knee in hyperflexion.  The scope was place medially to ensure the posterior wall had appropriate thickness and was intact.  Attention was then turned to the back table, and the Achilles allograft was prepared in a standard sterile fashion.  A FiberWire whipstitch was placed in the tendinous portion.  The graft was then pulled through the tibial tunnel and docked on the femoral side.  It was fixed with an Arthrex 7 x 20 mm metal interface screw.  The graft was then cycled and tensioned.  It was brought out to 20 degrees of flexion and was fixed with a 10 mm BioComposite screw.  The resulting Lachman was stable.  The scope was placed back in the knee, and the graft showed appropriate tension throughout range of motion with no impingement. The arthroscopic fluid and instruments were removed.     The remainder of the graft was cut flush  with the front of the tunnel, and the previously placed FiberWire sutures within the tendon were introduced into an Arthrex SwiveLock anchor for routine backup fixation.      The portals were closed with 3.0 Nylon; the tibial incision was closed with 2.0 Vicryl and 3.0 Nylon.  A dry, sterile dressing was placed and the tourniquet was deflated. A hinged knee brace was then applied. The sponge needle and instrument counts were correct at the end of the case. The patient tolerated the procedure well, was extubated, and was taken to recovery in stable condition.    Post-Operative Management:  The patient will be WBAT on the operative extremity.  After discharge, the patient will follow up in my office ([phone]) in 10 days after surgery.       Complications: No     Condition: Good     Disposition: PACU - hemodynamically stable.     Attestation: I was present and scrubbed for the entire procedure.    Implants:   Implant Name Type Inv. Item Serial No.  Lot No. LRB No. Used   [id number]  TENDON ACHILLES W/CALC 19.5CM [id number] MUSCULOSKELETAL TRANSPLANT FND  Right 1   SCREW CANNULATED 7MM X 20MM - [id number]  SCREW CANNULATED 7MM X 20MM  AR[employer] [id number] Right 1   FastThread BioComposite Interference screw [id number]    [employer] [id number] Right 1   KIT SECONDARY FIX ACL PCL REP - [id number]  KIT SECONDARY FIX ACL PCL REP  AR[employer] [id number] Right 1

## 2019-12-17 NOTE — TRANSFER OF CARE
"Anesthesia Transfer of Care Note    Patient: Jacqueline Lockhart    Procedure(s) Performed: Procedure(s) (LRB):  RECONSTRUCTION, KNEE, ACL, USING GRAFT (Right)  MENISCECTOMY, KNEE, MEDIAL and a lateral (Right)    Patient location: PACU    Anesthesia Type: general    Transport from OR: Transported from OR on 6-10 L/min O2 by face mask with adequate spontaneous ventilation    Post pain: adequate analgesia    Post assessment: no apparent anesthetic complications and tolerated procedure well    Post vital signs: stable    Level of consciousness: awake, alert and oriented    Nausea/Vomiting: no nausea/vomiting    Complications: none    Transfer of care protocol was followed      Last vitals:   Visit Vitals  /80   Pulse 91   Temp 37 °C (98.6 °F) (Skin)   Resp 17   Ht 5' 2" (1.575 m)   Wt 79.4 kg (175 lb)   LMP 12/16/2019 (Exact Date)   SpO2 (!) 94%   Breastfeeding? No   BMI 32.01 kg/m²     "

## 2019-12-17 NOTE — DISCHARGE INSTRUCTIONS
"Time Line After Knee Arthroscopy    Day 1-2 after surgery   Begin home knee exercises (see sheet "Dr. George Knee Exercises"), perform 3 times daily    Day 1-2 after surgery   Discontinue crutches     Day 3 after surgery  Remove the post-operative dressing, cover incisions with waterproof bandaids    You may shower, but keep incisions dry and covered with waterproof bandaids    Day 10-14   Follow up with surgeon for suture removal    Dr. George Knee Exercises   **Perform these exercises 3 times a day starting day 1 or 2 after surgery**    1. Ankle pumps: With your leg straight, bend your ankle up (toes  pointing straight up) and down (toes pointing straight out ahead of  you). Do 10 repetitions. Also, spell out the alphabet (A, B, C, D, etc)  forward and backward using your big toe as the pen or pencil.      2. Straight leg raises: With your leg straight, lift up your leg off the  bed about 2 feet (24 inches), then slowly bring the straight leg back  down to the bed. You should use your front thigh muscles (quad  muscles) to raise the leg.      3. Quad sets: With your leg straight out and your foot and ankle  resting on a rolled towel, tighten the front of your thigh (quad  muscles) and try to push the back of your knee flat down towards  the bed. Hold the leg in this position for 10 seconds, then relax.      4. Gluteal squeezes: While laying flat on your back, squeeze your butt  muscles (gluteals) together and hold together for 10 seconds, then  relax.        Discharge Instructions for Knee Arthroscopy  You had knee arthroscopy. This surgical procedure uses small incisions to locate, identify, and treat problems inside the knee. These problems include loose bodies, meniscal tears, bone spurs, osteochondritis dissecans (OCD), and synovitis. Below are tips to help speed your recovery from surgery.  Activity  · Dont drive until your doctor says its OK. And never drive while taking opioid pain " medicine.  · Remember to take pain medicines as directed; dont wait for the pain to get bad. And don't drink alcohol while taking pain medicines.  · Follow weight-bearing instructions given by your doctor. He or she may require you to use crutches to keep weight off your knee.  · Unless your doctor tells you otherwise, begin using the affected knee as much as you can tolerate 3 days after surgery.  · Slowly bend and straighten your affected leg as far as you can, unless your doctor tells you otherwise. Do this several times a day.  · Rest your knee by lying down and putting pillows under it for the first 3 days after surgery. Keep your ankle elevated above the level of your heart. This helps keep swelling down.  · Follow your doctors instructions about wearing and caring for a brace, immobilizer, or elastic dressing.  · Point and flex your foot, and rotate your ankle as much as possible during the first few weeks following surgery. Also, wiggle your toes as much as possible.  Incision care  · Check your incision daily for redness, tenderness, or drainage.  · Dont be alarmed if there is some bruising, slight swelling of the knee, or a small amount of blood on the bandage.  · Adjust the bandage or brace as needed. It should feel supportive on your knee, but not too tight.   · Dont soak your incision in water (no hot tubs, bathtubs, swimming pools) until your doctor says its OK.  · Wait 2 day(s) after your surgery to begin showering. Then shower as needed. Cover your knee with plastic to keep the dressing or brace dry. Once your dressing is removed, follow your doctors instructions for care of the wound. And sit on a shower stool so that you dont fall while showering.  · Use an ice pack or bag of frozen peas--or something similar--wrapped in a thin towel to reduce the swelling. Keep the foot elevated while you ice the knee. Apply the ice pack for 20 minutes; then remove it for 20 minutes. Repeat as needed. Icing  helps reduce swelling.  Other precautions  · Arrange your household to keep the items you need within reach.  · Remove throw rugs, electrical cords, and anything else that may cause you to fall.  · Use nonslip bath mats, grab bars, an elevated toilet seat, and a shower chair in your bathroom.  · Use a cane, crutches, a walker, or handrails until your balance, flexibility, and strength improve, and you can put weight on your leg. And remember to ask for help from others when you need it.  · Free up your hands so that you can use them to keep balance. Use a robert pack, apron, or pockets to carry things.  Follow-up  Make a follow-up appointment as directed by your doctor.     When to seek medical attention  Call 911 right away if you have any of the following:  · Chest pain  · Shortness of breath  · Severe nausea  Otherwise, call your doctor immediately if you have any of the following:  · Pain that is not relieved by medicine or rest  · Continued bleeding through the bandage  · Tingling, numbness, or coldness in your foot or leg  · Fever above 100.4°F (38.0°C) or shaking chills  · Excessive swelling, increased redness, or any drainage around the incision  · Swelling, tenderness, or pain in your leg      Date Last Reviewed: 11/16/2015  © 2873-9695 [employer]. [address] [place] [address], [place] [address]. All rights reserved. This information is not intended as a substitute for professional medical care. Always follow your healthcare professional's instructions.

## 2021-04-05 ENCOUNTER — IMMUNIZATION (OUTPATIENT)
Dept: OBSTETRICS AND GYNECOLOGY | Facility: CLINIC | Age: 31
End: 2021-04-05
Payer: MEDICAID

## 2021-04-05 DIAGNOSIS — Z23 NEED FOR VACCINATION: Primary | ICD-10-CM

## 2021-04-05 PROCEDURE — 91300 COVID-19, MRNA, LNP-S, PF, 30 MCG/0.3 ML DOSE VACCINE: ICD-10-PCS | Mod: S$GLB,,, | Performed by: FAMILY MEDICINE

## 2021-04-05 PROCEDURE — 0001A COVID-19, MRNA, LNP-S, PF, 30 MCG/0.3 ML DOSE VACCINE: ICD-10-PCS | Mod: CV19,S$GLB,, | Performed by: FAMILY MEDICINE

## 2021-04-05 PROCEDURE — 0001A COVID-19, MRNA, LNP-S, PF, 30 MCG/0.3 ML DOSE VACCINE: CPT | Mod: CV19,S$GLB,, | Performed by: FAMILY MEDICINE

## 2021-04-05 PROCEDURE — 91300 COVID-19, MRNA, LNP-S, PF, 30 MCG/0.3 ML DOSE VACCINE: CPT | Mod: S$GLB,,, | Performed by: FAMILY MEDICINE

## 2021-04-27 ENCOUNTER — IMMUNIZATION (OUTPATIENT)
Dept: OBSTETRICS AND GYNECOLOGY | Facility: CLINIC | Age: 31
End: 2021-04-27

## 2021-04-27 DIAGNOSIS — Z23 NEED FOR VACCINATION: Primary | ICD-10-CM

## 2021-04-27 PROCEDURE — 0002A COVID-19, MRNA, LNP-S, PF, 30 MCG/0.3 ML DOSE VACCINE: CPT | Mod: CV19,,, | Performed by: FAMILY MEDICINE

## 2021-04-27 PROCEDURE — 91300 COVID-19, MRNA, LNP-S, PF, 30 MCG/0.3 ML DOSE VACCINE: CPT | Mod: ,,, | Performed by: FAMILY MEDICINE

## 2021-04-27 PROCEDURE — 91300 COVID-19, MRNA, LNP-S, PF, 30 MCG/0.3 ML DOSE VACCINE: ICD-10-PCS | Mod: ,,, | Performed by: FAMILY MEDICINE

## 2021-04-27 PROCEDURE — 0002A COVID-19, MRNA, LNP-S, PF, 30 MCG/0.3 ML DOSE VACCINE: ICD-10-PCS | Mod: CV19,,, | Performed by: FAMILY MEDICINE
